# Patient Record
Sex: FEMALE | Race: WHITE | NOT HISPANIC OR LATINO | Employment: FULL TIME | URBAN - METROPOLITAN AREA
[De-identification: names, ages, dates, MRNs, and addresses within clinical notes are randomized per-mention and may not be internally consistent; named-entity substitution may affect disease eponyms.]

---

## 2020-10-15 PROBLEM — J30.2 SEASONAL ALLERGIES: Status: ACTIVE | Noted: 2020-01-07

## 2023-05-04 ENCOUNTER — OFFICE VISIT (OUTPATIENT)
Dept: OBSTETRICS AND GYNECOLOGY | Age: 43
End: 2023-05-04
Payer: COMMERCIAL

## 2023-05-04 VITALS
DIASTOLIC BLOOD PRESSURE: 74 MMHG | HEIGHT: 63 IN | SYSTOLIC BLOOD PRESSURE: 116 MMHG | WEIGHT: 158.2 LBS | BODY MASS INDEX: 28.03 KG/M2

## 2023-05-04 DIAGNOSIS — Z12.4 SCREENING FOR CERVICAL CANCER: ICD-10-CM

## 2023-05-04 DIAGNOSIS — Z11.51 SCREENING FOR HPV (HUMAN PAPILLOMAVIRUS): ICD-10-CM

## 2023-05-04 DIAGNOSIS — Z01.419 ENCOUNTER FOR GYNECOLOGICAL EXAMINATION WITHOUT ABNORMAL FINDING: Primary | ICD-10-CM

## 2023-05-04 RX ORDER — NORGESTIMATE AND ETHINYL ESTRADIOL 7DAYSX3 28
1 KIT ORAL DAILY
Qty: 84 TABLET | Refills: 3 | Status: SHIPPED | OUTPATIENT
Start: 2023-05-04

## 2023-05-04 NOTE — PROGRESS NOTES
Routine Annual Visit    2023    Patient: Candice Chatman          MR#:[id number]      Chief Complaint   Patient presents with   • Gynecologic Exam     Annual exam - last pap 10/15/20 neg, mammo 10/20/22, Pt states she has noticed abdominal cramping about 2 weeks before period starts, Pt states this has been happening since around December.        History of Present Illness    43 y.o. female  who presents for annual exam.     On ocps , non smoker  mammo due in oct  Pap done today    Having low level cramping for 2 week leading up to menses  No irregular bleeding  Exam is normal today, will observe, if worsens or irregular bleeding would rec gyn US      Patient's last menstrual period was 2023 (exact date).  Obstetric History:  OB History        1    Para        Term   0            AB   1    Living   0       SAB        IAB        Ectopic        Molar        Multiple        Live Births                   Menstrual History:     Patient's last menstrual period was 2023 (exact date).       Sexual History:       ________________________________________  Patient Active Problem List   Diagnosis   • Slow transit constipation   • Encounter for surveillance of contraceptive pills   • Heartburn   • Anxiety disorder   • BUSCH (dyspnea on exertion)   • Former smoker   • GERD (gastroesophageal reflux disease)   • Seasonal allergies       Past Medical History:   Diagnosis Date   • Anemia    • ASCUS with positive high risk HPV cervical    • Dysplasia of cervix, low grade (LILIANA 1)    • HPV test positive        Past Surgical History:   Procedure Laterality Date   • ABDOMINOPLASTY     • BILATERAL BREAST REDUCTION  2010   • BREAST BIOPSY     • COLONOSCOPY  approx     LGA   • UPPER GASTROINTESTINAL ENDOSCOPY  approx    • WISDOM TOOTH EXTRACTION         Social History     Tobacco Use   Smoking Status Former   Smokeless Tobacco Never       has a current medication list which includes the  "following prescription(s): cetirizine, norgestimate-ethinyl estradiol, and pantoprazole.  ________________________________________    Current contraception: OCP (estrogen/progesterone)  History of abnormal Pap smear: no  Family history of Breast cancer: PGM  Family history of uterine or ovarian cancer: no  Family History of colon cancer/colon polyps: no  History of abnormal mammogram: no      The following portions of the patient's history were reviewed and updated as appropriate: allergies, current medications, past family history, past medical history, past social history, past surgical history and problem list.    Review of Systems    Pertinent items are noted in HPI.     Objective   Physical Exam    /74   Ht 160 cm (63\")   Wt 71.8 kg (158 lb 3.2 oz)   LMP 04/11/2023 (Exact Date)   BMI 28.02 kg/m²    BP Readings from Last 3 Encounters:   05/04/23 116/74   04/01/22 118/70   01/07/22 117/77      Wt Readings from Last 3 Encounters:   05/04/23 71.8 kg (158 lb 3.2 oz)   04/01/22 73 kg (160 lb 14.4 oz)   01/26/22 73.8 kg (162 lb 12.8 oz)      BMI: Estimated body mass index is 28.02 kg/m² as calculated from the following:    Height as of this encounter: 160 cm (63\").    Weight as of this encounter: 71.8 kg (158 lb 3.2 oz).      General:   alert, appears stated age and cooperative   Abdomen: soft, non-tender, without masses or organomegaly   Breast: inspection negative, no nipple discharge or bleeding, no masses or nodularity palpable   Vulva: normal   Vagina: normal mucosa   Cervix: no cervical motion tenderness and no lesions   Uterus: normal size, mobile and non-tender   Adnexa: normal adnexa and no mass, fullness, tenderness     Assessment:    1. Normal annual exam   Assessment     ICD-10-CM ICD-9-CM   1. Encounter for gynecological examination without abnormal finding  Z01.419 V72.31   2. Screening for cervical cancer  Z12.4 V76.2   3. Screening for HPV (human papillomavirus)  Z11.51 V73.81 "     Plan:    Plan     []  Mammogram request made  [x]  PAP done  []  Labs:   []  GC/Chl/TV  []  DEXA scan   []  Referral for colonoscopy:       Diagnoses and all orders for this visit:    1. Encounter for gynecological examination without abnormal finding (Primary)    2. Screening for cervical cancer  -     IGP, Apt HPV,rfx 16 / 18,45    3. Screening for HPV (human papillomavirus)  -     IGP, Apt HPV,rfx 16 / 18,45    Other orders  -     norgestimate-ethinyl estradiol (Tri-Sprintec) 0.18/0.215/0.25 MG-35 MCG per tablet; Take 1 tablet by mouth Daily.  Dispense: 84 tablet; Refill: 3            Counseling:  --Nutrition: Stressed importance of moderation and caloric balance, stressed fresh fruit and vegetables  --Exercise: Stressed the importance of regular exercise. 3-5 times weekly   - Discussed screening mammogram recommendations.   --Discussed benefits of screening colonoscopy- age 45 unless FH  --Discussed pap smear screening recommendations

## 2023-05-10 LAB
CYTOLOGIST CVX/VAG CYTO: NORMAL
CYTOLOGY CVX/VAG DOC CYTO: NORMAL
CYTOLOGY CVX/VAG DOC THIN PREP: NORMAL
DX ICD CODE: NORMAL
HIV 1 & 2 AB SER-IMP: NORMAL
HPV I/H RISK 4 DNA CVX QL PROBE+SIG AMP: NEGATIVE
OTHER STN SPEC: NORMAL
STAT OF ADQ CVX/VAG CYTO-IMP: NORMAL

## 2023-08-02 DIAGNOSIS — Z12.31 SCREENING MAMMOGRAM FOR BREAST CANCER: Primary | ICD-10-CM

## 2023-08-07 ENCOUNTER — OFFICE VISIT (OUTPATIENT)
Dept: OBSTETRICS AND GYNECOLOGY | Age: 43
End: 2023-08-07
Payer: COMMERCIAL

## 2023-08-07 VITALS
BODY MASS INDEX: 27.46 KG/M2 | SYSTOLIC BLOOD PRESSURE: 112 MMHG | DIASTOLIC BLOOD PRESSURE: 70 MMHG | HEIGHT: 63 IN | WEIGHT: 155 LBS

## 2023-08-07 DIAGNOSIS — N94.6 DYSMENORRHEA: ICD-10-CM

## 2023-08-07 DIAGNOSIS — R10.2 PELVIC PAIN: Primary | ICD-10-CM

## 2023-08-07 DIAGNOSIS — R63.5 WEIGHT GAIN: ICD-10-CM

## 2023-08-07 DIAGNOSIS — D25.1 INTRAMURAL LEIOMYOMA OF UTERUS: ICD-10-CM

## 2023-08-07 PROCEDURE — 99213 OFFICE O/P EST LOW 20 MIN: CPT | Performed by: OBSTETRICS & GYNECOLOGY

## 2023-08-07 RX ORDER — MONTELUKAST SODIUM 10 MG/1
1 TABLET ORAL NIGHTLY
COMMUNITY
Start: 2023-07-31

## 2023-08-07 NOTE — PROGRESS NOTES
GYN Visit    2023    Patient: Candice Chatman          MR#:[id number]      Chief Complaint   Patient presents with    Follow-up     Gyn F/u & US - Pt c/o abdominal cramping but states she does not have heavy menses, pt states she met with a dietician a few weeks ago & they advised her to have her hormones tested today         History of Present Illness    43 y.o. female  who presents for  new issues x2    Cramping between cycles, on ocps    Trouble losing weight, saw a dietician who suggested she get her hormones tested    Occ hot at night, not nec a hot flash or NS, no change on placebo days of ocp    Reviewed US- fairly normal, 2 small fibroids and left 2.9 cm follicle , no pain with scan    Discussed IUD, ablation , Vas and hyst to treat cramping and change metabolic situation or hormonal situation (get off the pill)  Pt declines          Patient's last menstrual period was 2023 (exact date).    ________________________________________  Patient Active Problem List   Diagnosis    Slow transit constipation    Encounter for surveillance of contraceptive pills    Heartburn    Anxiety disorder    BUSCH (dyspnea on exertion)    Former smoker    GERD (gastroesophageal reflux disease)    Seasonal allergies       Past Medical History:   Diagnosis Date    Anemia     ASCUS with positive high risk HPV cervical 2010    Dysplasia of cervix, low grade (LILIANA 1)     HPV test positive        Past Surgical History:   Procedure Laterality Date    ABDOMINOPLASTY      BILATERAL BREAST REDUCTION  2010    BREAST BIOPSY      COLONOSCOPY  approx     LGA    UPPER GASTROINTESTINAL ENDOSCOPY  approx     WISDOM TOOTH EXTRACTION         Social History     Tobacco Use   Smoking Status Former   Smokeless Tobacco Never       has a current medication list which includes the following prescription(s): cetirizine, montelukast, norgestimate-ethinyl estradiol, and  "pantoprazole.  ________________________________________    Current contraception: OCP (estrogen/progesterone)      The following portions of the patient's history were reviewed and updated as appropriate: allergies, current medications, past family history, past medical history, past social history, past surgical history, and problem list.    Review of Systems    Pertinent items are noted in HPI.     Objective   Physical Exam    /70   Ht 160 cm (63\")   Wt 70.3 kg (155 lb)   LMP 08/02/2023 (Exact Date)   BMI 27.46 kg/mý    BP Readings from Last 3 Encounters:   08/07/23 112/70   05/04/23 116/74   04/01/22 118/70      Wt Readings from Last 3 Encounters:   08/07/23 70.3 kg (155 lb)   05/04/23 71.8 kg (158 lb 3.2 oz)   04/01/22 73 kg (160 lb 14.4 oz)      BMI: Estimated body mass index is 27.46 kg/mý as calculated from the following:    Height as of this encounter: 160 cm (63\").    Weight as of this encounter: 70.3 kg (155 lb).    Lungs: non labored breathing, no wheezing or tachpnea  Extremities: extremities normal, atraumatic, no cyanosis or edema  Skin: Skin color, texture, turgor normal. No rashes or lesions  Neurologic: Grossly normal  General:   alert, appears stated age, and cooperative   Abdomen: soft, non-tender, without masses or organomegaly            See report of US  Normal size and endometrial lining is thin, 2 small fibroids, ovaries normal, left with 2.9 cm cyst                 Assessment:      Diagnoses and all orders for this visit:    1. Pelvic pain (Primary)    2. Dysmenorrhea    3. Intramural leiomyoma of uterus    4. Weight gain  -     TSH      Would need to be off ocps to test hormones and would not change plan , reassured pt, US is normal         "

## 2023-08-08 LAB — TSH SERPL DL<=0.005 MIU/L-ACNC: 2.04 UIU/ML (ref 0.27–4.2)

## 2023-10-10 RX ORDER — PANTOPRAZOLE SODIUM 40 MG/1
40 TABLET, DELAYED RELEASE ORAL DAILY
Qty: 90 TABLET | Refills: 1 | Status: SHIPPED | OUTPATIENT
Start: 2023-10-10

## 2023-10-25 ENCOUNTER — HOSPITAL ENCOUNTER (OUTPATIENT)
Facility: HOSPITAL | Age: 43
Discharge: HOME | End: 2023-10-25
Admitting: OBSTETRICS & GYNECOLOGY
Payer: COMMERCIAL

## 2023-10-25 DIAGNOSIS — Z12.31 SCREENING MAMMOGRAM FOR BREAST CANCER: ICD-10-CM

## 2023-10-25 PROCEDURE — 77067 SCR MAMMO BI INCL CAD: CPT

## 2023-10-25 PROCEDURE — 77063 BREAST TOMOSYNTHESIS BI: CPT

## 2023-10-30 DIAGNOSIS — R92.1 BREAST CALCIFICATIONS: Primary | ICD-10-CM

## 2023-11-03 ENCOUNTER — APPOINTMENT (OUTPATIENT)
Dept: WOMENS IMAGING | Facility: HOSPITAL | Age: 43
End: 2023-11-03
Payer: COMMERCIAL

## 2023-11-03 PROCEDURE — 77065 DX MAMMO INCL CAD UNI: CPT | Performed by: RADIOLOGY

## 2023-11-03 PROCEDURE — G0279 TOMOSYNTHESIS, MAMMO: HCPCS | Performed by: RADIOLOGY

## 2023-11-03 PROCEDURE — 77061 BREAST TOMOSYNTHESIS UNI: CPT | Performed by: RADIOLOGY

## 2023-11-09 DIAGNOSIS — R92.8 ABNORMAL MAMMOGRAM: Primary | ICD-10-CM

## 2024-01-10 ENCOUNTER — OFFICE VISIT (OUTPATIENT)
Dept: GASTROENTEROLOGY | Facility: CLINIC | Age: 44
End: 2024-01-10
Payer: COMMERCIAL

## 2024-01-10 VITALS
WEIGHT: 156.2 LBS | DIASTOLIC BLOOD PRESSURE: 79 MMHG | BODY MASS INDEX: 27.68 KG/M2 | HEART RATE: 62 BPM | TEMPERATURE: 98.3 F | HEIGHT: 63 IN | SYSTOLIC BLOOD PRESSURE: 117 MMHG

## 2024-01-10 DIAGNOSIS — K59.01 SLOW TRANSIT CONSTIPATION: ICD-10-CM

## 2024-01-10 DIAGNOSIS — R12 HEARTBURN: Primary | ICD-10-CM

## 2024-01-10 RX ORDER — PANTOPRAZOLE SODIUM 40 MG/1
40 TABLET, DELAYED RELEASE ORAL DAILY
Qty: 90 TABLET | Refills: 3 | Status: SHIPPED | OUTPATIENT
Start: 2024-01-10

## 2024-01-10 RX ORDER — FAMOTIDINE 40 MG/1
40 TABLET, FILM COATED ORAL
Qty: 90 TABLET | Refills: 2 | Status: SHIPPED | OUTPATIENT
Start: 2024-01-10

## 2024-01-10 RX ORDER — AMOXICILLIN 500 MG/1
500 TABLET, FILM COATED ORAL
COMMUNITY
Start: 2024-01-08 | End: 2024-01-18

## 2024-01-10 NOTE — PROGRESS NOTES
"Chief Complaint  Heartburn    Subjective          History Of Present Illness:    Candice Chatman is a  43 y.o. female patient of Dr. Hussein who has a history of esophageal dysphagia, GERD.     Patient reports she has had increased heartburn over the last month. She has been taking Protonix daily. She has been using navin-seltzer chewables and Nexium but did not feel like this helped.  Patient reports the symptoms are worse at night. No dysphagia, feels like she has increased drainage in the back of her throat. Patient reports she has minimal stress and  has not made many food changes.  She has been drinking lemon in her water and has stopped this recently. She sleeps upright.  No nausea or vomiting.  Appetite is good and weight is stable.    She reports constipation. Previously on Linzess. She tries to increase her water intake. She east a high fiber.  No rectal bleeding or abdominal pain.    Additional data reviewed:   C-scope 1/23/19 - melanosis, normal ileum, NBIH  EGD 4/13/22 - empiric dilation of esophagus, acute gastritis, normal duodenum     Objective   Vital Signs:   /79   Pulse 62   Temp 98.3 °F (36.8 °C)   Ht 160 cm (63\")   Wt 70.9 kg (156 lb 3.2 oz)   BMI 27.67 kg/m²       Physical Exam  Vitals reviewed.   Constitutional:       General: She is not in acute distress.     Appearance: Normal appearance. She is not ill-appearing.   HENT:      Head: Normocephalic and atraumatic.      Nose: Nose normal.   Eyes:      Extraocular Movements: Extraocular movements intact.      Conjunctiva/sclera: Conjunctivae normal.      Pupils: Pupils are equal, round, and reactive to light.   Pulmonary:      Effort: Pulmonary effort is normal.   Musculoskeletal:         General: No swelling. Normal range of motion.      Cervical back: Normal range of motion.   Skin:     General: Skin is warm and dry.      Findings: No bruising or rash.   Neurological:      General: No focal deficit present.      Mental Status: She is " alert and oriented to person, place, and time.      Motor: No weakness.      Gait: Gait normal.   Psychiatric:         Mood and Affect: Mood normal.          Result Review :   The following data was reviewed by: Caitlin Merchant PA-C on 01/10/2024:      Assessment and Plan    Diagnoses and all orders for this visit:    1. Heartburn (Primary)  -     famotidine (Pepcid) 40 MG tablet; Take 1 tablet by mouth Every Morning Before Breakfast.  Dispense: 90 tablet; Refill: 2  -     pantoprazole (PROTONIX) 40 MG EC tablet; Take 1 tablet by mouth Daily.  Dispense: 90 tablet; Refill: 3    2. Slow transit constipation       Start Pepcid 40 mg in the morning and take pantoprazole 30 minutes before dinner at night  Patient to update me in approximately 6 weeks  Continue increased water intake and high-fiber diet for constipation.  We did discuss that she could use MiraLAX as needed or even daily if she is persistently constipated.    Follow Up   Return if symptoms worsen or fail to improve.    Dragon dictation used throughout this note.            Caitlin Manuel PA-C   [facility]  [address] [place] [address]  Office: [phone]

## 2024-05-07 ENCOUNTER — APPOINTMENT (OUTPATIENT)
Dept: WOMENS IMAGING | Facility: HOSPITAL | Age: 44
End: 2024-05-07
Payer: COMMERCIAL

## 2024-05-07 PROCEDURE — G0279 TOMOSYNTHESIS, MAMMO: HCPCS | Performed by: RADIOLOGY

## 2024-05-07 PROCEDURE — 77065 DX MAMMO INCL CAD UNI: CPT | Performed by: RADIOLOGY

## 2024-05-07 PROCEDURE — 77061 BREAST TOMOSYNTHESIS UNI: CPT | Performed by: RADIOLOGY

## 2024-05-15 ENCOUNTER — TELEPHONE (OUTPATIENT)
Dept: OBSTETRICS AND GYNECOLOGY | Age: 44
End: 2024-05-15
Payer: COMMERCIAL

## 2024-05-15 DIAGNOSIS — R92.1 CALCIFICATION OF LEFT BREAST: Primary | ICD-10-CM

## 2024-05-15 NOTE — TELEPHONE ENCOUNTER
5/15/2024   pt and imaging results given and the need for a breast bx. Order placed for L stereotatic biopsy and someone will call to schedule

## 2024-05-29 RX ORDER — NORGESTIMATE AND ETHINYL ESTRADIOL 7DAYSX3 28
1 KIT ORAL DAILY
Qty: 84 TABLET | Refills: 0 | Status: SHIPPED | OUTPATIENT
Start: 2024-05-29

## 2024-06-10 ENCOUNTER — APPOINTMENT (OUTPATIENT)
Dept: WOMENS IMAGING | Facility: HOSPITAL | Age: 44
End: 2024-06-10
Payer: COMMERCIAL

## 2024-06-10 PROCEDURE — 77061 BREAST TOMOSYNTHESIS UNI: CPT | Performed by: RADIOLOGY

## 2024-06-10 PROCEDURE — 77065 DX MAMMO INCL CAD UNI: CPT | Performed by: RADIOLOGY

## 2024-06-10 PROCEDURE — G0279 TOMOSYNTHESIS, MAMMO: HCPCS | Performed by: RADIOLOGY

## 2024-06-11 DIAGNOSIS — R92.1 CALCIFICATION OF LEFT BREAST: Primary | ICD-10-CM

## 2024-07-15 ENCOUNTER — OFFICE VISIT (OUTPATIENT)
Dept: OBSTETRICS AND GYNECOLOGY | Age: 44
End: 2024-07-15
Payer: COMMERCIAL

## 2024-07-15 VITALS
DIASTOLIC BLOOD PRESSURE: 86 MMHG | BODY MASS INDEX: 25.45 KG/M2 | SYSTOLIC BLOOD PRESSURE: 118 MMHG | WEIGHT: 143.6 LBS | HEIGHT: 63 IN

## 2024-07-15 DIAGNOSIS — Z12.31 SCREENING MAMMOGRAM FOR BREAST CANCER: ICD-10-CM

## 2024-07-15 DIAGNOSIS — Z01.419 ENCOUNTER FOR GYNECOLOGICAL EXAMINATION WITHOUT ABNORMAL FINDING: Primary | ICD-10-CM

## 2024-07-15 PROCEDURE — 99396 PREV VISIT EST AGE 40-64: CPT | Performed by: OBSTETRICS & GYNECOLOGY

## 2024-07-15 RX ORDER — NORGESTIMATE AND ETHINYL ESTRADIOL 7DAYSX3 28
1 KIT ORAL DAILY
Qty: 84 TABLET | Refills: 3 | Status: SHIPPED | OUTPATIENT
Start: 2024-07-15

## 2024-07-15 NOTE — PROGRESS NOTES
Routine Annual Visit    7/15/2024    Patient: Candice Chatman          MR#:[id number]      Chief Complaint   Patient presents with    Gynecologic Exam     Annual Exam - last pap 23 neg, mammo 10/25/23, pt has no complaints today       History of Present Illness    44 y.o. female  who presents for annual exam.     Patient is doing well  She is up-to-date on her Pap  Mammogram is due in October  She is on a birth control pill and doing well on this  No other concerns      Patient's last menstrual period was 2024.  Obstetric History:  OB History          1    Para        Term   0            AB   1    Living   0         SAB        IAB        Ectopic        Molar        Multiple        Live Births                   Menstrual History:     Patient's last menstrual period was 2024.       Sexual History:       ________________________________________  Patient Active Problem List   Diagnosis    Slow transit constipation    Encounter for surveillance of contraceptive pills    Heartburn    Anxiety disorder    BUSCH (dyspnea on exertion)    Former smoker    GERD (gastroesophageal reflux disease)    Seasonal allergies       Past Medical History:   Diagnosis Date    Anemia     Anxiety disorder 2019    ASCUS with positive high risk HPV cervical 2010    Dysplasia of cervix, low grade (LILIANA 1)     HPV test positive        Past Surgical History:   Procedure Laterality Date    ABDOMINOPLASTY      BILATERAL BREAST REDUCTION  2010    BREAST BIOPSY      COLONOSCOPY  approx     LGA    UPPER GASTROINTESTINAL ENDOSCOPY  approx     WISDOM TOOTH EXTRACTION         Social History     Tobacco Use   Smoking Status Former    Current packs/day: 0.00    Types: Cigarettes    Quit date: 2018    Years since quittin.9   Smokeless Tobacco Never       has a current medication list which includes the following prescription(s): famotidine, norgestimate-ethinyl estradiol, and  "pantoprazole.  ________________________________________    Current contraception: OCP (estrogen/progesterone)  History of abnormal Pap smear: no  Family history of Breast cancer: MGM  Family history of uterine or ovarian cancer: no  Family History of colon cancer/colon polyps: no  History of abnormal mammogram: no      The following portions of the patient's history were reviewed and updated as appropriate: allergies, current medications, past family history, past medical history, past social history, past surgical history, and problem list.    Review of Systems    Pertinent items are noted in HPI.     Objective   Physical Exam    /86   Ht 160 cm (63\")   Wt 65.1 kg (143 lb 9.6 oz)   LMP 07/04/2024   BMI 25.44 kg/m²    BP Readings from Last 3 Encounters:   07/15/24 118/86   01/10/24 117/79   08/07/23 112/70      Wt Readings from Last 3 Encounters:   07/15/24 65.1 kg (143 lb 9.6 oz)   01/10/24 70.9 kg (156 lb 3.2 oz)   08/07/23 70.3 kg (155 lb)      BMI: Estimated body mass index is 25.44 kg/m² as calculated from the following:    Height as of this encounter: 160 cm (63\").    Weight as of this encounter: 65.1 kg (143 lb 9.6 oz).      General:   alert, appears stated age, and cooperative   Abdomen: soft, non-tender, without masses or organomegaly   Breast: inspection negative, no nipple discharge or bleeding, no masses or nodularity palpable   Vulva: normal   Vagina: normal mucosa   Cervix: no cervical motion tenderness and no lesions   Uterus: normal size, mobile, and non-tender   Adnexa: no mass, fullness, tenderness     Assessment:    1. Normal annual exam   Assessment     ICD-10-CM ICD-9-CM   1. Encounter for gynecological examination without abnormal finding  Z01.419 V72.31   2. Screening mammogram for breast cancer  Z12.31 V76.12     Plan:    Plan     [x]  Mammogram request made  []  PAP done  []  Labs:   []  GC/Chl/TV  []  DEXA scan   []  Referral for colonoscopy:       Diagnoses and all orders for " this visit:    1. Encounter for gynecological examination without abnormal finding (Primary)    2. Screening mammogram for breast cancer  -     Mammo Screening Digital Tomosynthesis Bilateral With CAD; Future    Other orders  -     norgestimate-ethinyl estradiol (Tri-Sprintec) 0.18/0.215/0.25 MG-35 MCG per tablet; Take 1 tablet by mouth Daily.  Dispense: 84 tablet; Refill: 3            Counseling:  --Nutrition: Stressed importance of moderation and caloric balance, stressed fresh fruit and vegetables  --Exercise: Stressed the importance of regular exercise. 3-5 times weekly   - Discussed screening mammogram recommendations.   --Discussed benefits of screening colonoscopy- age 45 unless FH  --Discussed pap smear screening recommendations

## 2024-09-15 DIAGNOSIS — R12 HEARTBURN: ICD-10-CM

## 2024-09-16 RX ORDER — FAMOTIDINE 40 MG/1
40 TABLET, FILM COATED ORAL
Qty: 90 TABLET | Refills: 2 | Status: SHIPPED | OUTPATIENT
Start: 2024-09-16

## 2024-10-15 ENCOUNTER — APPOINTMENT (OUTPATIENT)
Dept: WOMENS IMAGING | Facility: HOSPITAL | Age: 44
End: 2024-10-15
Payer: COMMERCIAL

## 2024-10-15 PROCEDURE — 77066 DX MAMMO INCL CAD BI: CPT | Performed by: RADIOLOGY

## 2024-10-15 PROCEDURE — G0279 TOMOSYNTHESIS, MAMMO: HCPCS | Performed by: RADIOLOGY

## 2024-10-15 PROCEDURE — 77062 BREAST TOMOSYNTHESIS BI: CPT | Performed by: RADIOLOGY

## 2024-10-17 DIAGNOSIS — R92.8 ABNORMALITY OF LEFT BREAST ON SCREENING MAMMOGRAM: Primary | ICD-10-CM

## 2024-12-26 DIAGNOSIS — R12 HEARTBURN: ICD-10-CM

## 2024-12-26 RX ORDER — PANTOPRAZOLE SODIUM 40 MG/1
40 TABLET, DELAYED RELEASE ORAL DAILY
Qty: 90 TABLET | Refills: 1 | Status: SHIPPED | OUTPATIENT
Start: 2024-12-26

## 2025-04-17 ENCOUNTER — APPOINTMENT (OUTPATIENT)
Dept: WOMENS IMAGING | Facility: HOSPITAL | Age: 45
End: 2025-04-17
Payer: COMMERCIAL

## 2025-04-17 PROCEDURE — 77065 DX MAMMO INCL CAD UNI: CPT | Performed by: RADIOLOGY

## 2025-04-17 PROCEDURE — G0279 TOMOSYNTHESIS, MAMMO: HCPCS | Performed by: RADIOLOGY

## 2025-04-17 PROCEDURE — 77061 BREAST TOMOSYNTHESIS UNI: CPT | Performed by: RADIOLOGY

## 2025-04-21 DIAGNOSIS — Z12.31 SCREENING MAMMOGRAM FOR BREAST CANCER: ICD-10-CM

## 2025-04-21 DIAGNOSIS — R92.1 CALCIFICATION OF LEFT BREAST: Primary | ICD-10-CM

## 2025-06-04 DIAGNOSIS — R12 HEARTBURN: ICD-10-CM

## 2025-06-05 ENCOUNTER — TELEPHONE (OUTPATIENT)
Dept: GASTROENTEROLOGY | Facility: CLINIC | Age: 45
End: 2025-06-05
Payer: COMMERCIAL

## 2025-06-05 DIAGNOSIS — R12 HEARTBURN: ICD-10-CM

## 2025-06-05 RX ORDER — PANTOPRAZOLE SODIUM 40 MG/1
40 TABLET, DELAYED RELEASE ORAL DAILY
Qty: 90 TABLET | Refills: 1 | OUTPATIENT
Start: 2025-06-05

## 2025-06-05 RX ORDER — FAMOTIDINE 40 MG/1
40 TABLET, FILM COATED ORAL
Qty: 90 TABLET | Refills: 1 | Status: SHIPPED | OUTPATIENT
Start: 2025-06-05

## 2025-06-05 RX ORDER — PANTOPRAZOLE SODIUM 40 MG/1
40 TABLET, DELAYED RELEASE ORAL DAILY
Qty: 90 TABLET | Refills: 1 | Status: SHIPPED | OUTPATIENT
Start: 2025-06-05

## 2025-06-05 RX ORDER — FAMOTIDINE 40 MG/1
40 TABLET, FILM COATED ORAL
Qty: 90 TABLET | Refills: 2 | OUTPATIENT
Start: 2025-06-05

## 2025-06-05 NOTE — TELEPHONE ENCOUNTER
Scheduled the pt to see Caitlin on 8/25 for a follow up.  Pt needs her pantoprazole & famotidine refilled.

## 2025-06-15 PROBLEM — Z81.8 FAMILY HISTORY OF DEMENTIA: Status: ACTIVE | Noted: 2024-06-18

## 2025-06-15 PROBLEM — K59.09 CHRONIC CONSTIPATION: Status: ACTIVE | Noted: 2024-06-18

## 2025-06-15 PROBLEM — E78.2 MIXED HYPERLIPIDEMIA: Status: ACTIVE | Noted: 2024-06-18

## 2025-06-16 ENCOUNTER — HOSPITAL ENCOUNTER (EMERGENCY)
Facility: HOSPITAL | Age: 45
Discharge: HOME | End: 2025-06-16
Attending: EMERGENCY MEDICINE | Admitting: EMERGENCY MEDICINE
Payer: COMMERCIAL

## 2025-06-16 ENCOUNTER — APPOINTMENT (OUTPATIENT)
Dept: CT IMAGING | Facility: HOSPITAL | Age: 45
End: 2025-06-16
Payer: COMMERCIAL

## 2025-06-16 VITALS
TEMPERATURE: 98.3 F | RESPIRATION RATE: 16 BRPM | SYSTOLIC BLOOD PRESSURE: 118 MMHG | DIASTOLIC BLOOD PRESSURE: 75 MMHG | OXYGEN SATURATION: 97 % | HEART RATE: 76 BPM

## 2025-06-16 DIAGNOSIS — R51.9 ACUTE NONINTRACTABLE HEADACHE, UNSPECIFIED HEADACHE TYPE: Primary | ICD-10-CM

## 2025-06-16 LAB
ANION GAP SERPL CALCULATED.3IONS-SCNC: 11 MMOL/L (ref 5–15)
BASOPHILS # BLD AUTO: 0.1 10*3/MM3 (ref 0–0.2)
BASOPHILS NFR BLD AUTO: 0.9 % (ref 0–1.5)
BUN SERPL-MCNC: 10 MG/DL (ref 6–20)
BUN/CREAT SERPL: 11.6 (ref 7–25)
CALCIUM SPEC-SCNC: 9.6 MG/DL (ref 8.6–10.5)
CHLORIDE SERPL-SCNC: 105 MMOL/L (ref 98–107)
CO2 SERPL-SCNC: 22 MMOL/L (ref 22–29)
CREAT SERPL-MCNC: 0.86 MG/DL (ref 0.57–1)
DEPRECATED RDW RBC AUTO: 38.2 FL (ref 37–54)
EGFRCR SERPLBLD CKD-EPI 2021: 85 ML/MIN/1.73
EOSINOPHIL # BLD AUTO: 0.16 10*3/MM3 (ref 0–0.4)
EOSINOPHIL NFR BLD AUTO: 1.4 % (ref 0.3–6.2)
ERYTHROCYTE [DISTWIDTH] IN BLOOD BY AUTOMATED COUNT: 11.8 % (ref 12.3–15.4)
GLUCOSE SERPL-MCNC: 85 MG/DL (ref 65–99)
HCT VFR BLD AUTO: 39.3 % (ref 34–46.6)
HGB BLD-MCNC: 13.4 G/DL (ref 12–15.9)
IMM GRANULOCYTES # BLD AUTO: 0.03 10*3/MM3 (ref 0–0.05)
IMM GRANULOCYTES NFR BLD AUTO: 0.3 % (ref 0–0.5)
LYMPHOCYTES # BLD AUTO: 2.93 10*3/MM3 (ref 0.7–3.1)
LYMPHOCYTES NFR BLD AUTO: 25.9 % (ref 19.6–45.3)
MCH RBC QN AUTO: 30.3 PG (ref 26.6–33)
MCHC RBC AUTO-ENTMCNC: 34.1 G/DL (ref 31.5–35.7)
MCV RBC AUTO: 88.9 FL (ref 79–97)
MONOCYTES # BLD AUTO: 0.76 10*3/MM3 (ref 0.1–0.9)
MONOCYTES NFR BLD AUTO: 6.7 % (ref 5–12)
NEUTROPHILS NFR BLD AUTO: 64.8 % (ref 42.7–76)
NEUTROPHILS NFR BLD AUTO: 7.35 10*3/MM3 (ref 1.7–7)
NRBC BLD AUTO-RTO: 0 /100 WBC (ref 0–0.2)
PLATELET # BLD AUTO: 281 10*3/MM3 (ref 140–450)
PMV BLD AUTO: 10.3 FL (ref 6–12)
POTASSIUM SERPL-SCNC: 4.4 MMOL/L (ref 3.5–5.2)
RBC # BLD AUTO: 4.42 10*6/MM3 (ref 3.77–5.28)
SODIUM SERPL-SCNC: 138 MMOL/L (ref 136–145)
WBC NRBC COR # BLD AUTO: 11.33 10*3/MM3 (ref 3.4–10.8)

## 2025-06-16 PROCEDURE — 96374 THER/PROPH/DIAG INJ IV PUSH: CPT

## 2025-06-16 PROCEDURE — 25010000002 DIPHENHYDRAMINE PER 50 MG: Performed by: EMERGENCY MEDICINE

## 2025-06-16 PROCEDURE — 70450 CT HEAD/BRAIN W/O DYE: CPT

## 2025-06-16 PROCEDURE — 80048 BASIC METABOLIC PNL TOTAL CA: CPT | Performed by: EMERGENCY MEDICINE

## 2025-06-16 PROCEDURE — 96375 TX/PRO/DX INJ NEW DRUG ADDON: CPT

## 2025-06-16 PROCEDURE — 25010000002 DROPERIDOL PER 5 MG: Performed by: EMERGENCY MEDICINE

## 2025-06-16 PROCEDURE — 25010000002 KETOROLAC TROMETHAMINE PER 15 MG: Performed by: EMERGENCY MEDICINE

## 2025-06-16 PROCEDURE — 99284 EMERGENCY DEPT VISIT MOD MDM: CPT

## 2025-06-16 PROCEDURE — 85025 COMPLETE CBC W/AUTO DIFF WBC: CPT | Performed by: EMERGENCY MEDICINE

## 2025-06-16 RX ORDER — METOCLOPRAMIDE 5 MG/1
5 TABLET ORAL 3 TIMES DAILY PRN
Qty: 30 TABLET | Refills: 0 | Status: SHIPPED | OUTPATIENT
Start: 2025-06-16

## 2025-06-16 RX ORDER — DIPHENHYDRAMINE HYDROCHLORIDE 50 MG/ML
25 INJECTION, SOLUTION INTRAMUSCULAR; INTRAVENOUS ONCE
Status: COMPLETED | OUTPATIENT
Start: 2025-06-16 | End: 2025-06-16

## 2025-06-16 RX ORDER — SODIUM CHLORIDE 0.9 % (FLUSH) 0.9 %
10 SYRINGE (ML) INJECTION AS NEEDED
Status: DISCONTINUED | OUTPATIENT
Start: 2025-06-16 | End: 2025-06-16 | Stop reason: HOSPADM

## 2025-06-16 RX ORDER — DROPERIDOL 2.5 MG/ML
2.5 INJECTION, SOLUTION INTRAMUSCULAR; INTRAVENOUS ONCE
Status: COMPLETED | OUTPATIENT
Start: 2025-06-16 | End: 2025-06-16

## 2025-06-16 RX ORDER — KETOROLAC TROMETHAMINE 15 MG/ML
15 INJECTION, SOLUTION INTRAMUSCULAR; INTRAVENOUS ONCE
Status: COMPLETED | OUTPATIENT
Start: 2025-06-16 | End: 2025-06-16

## 2025-06-16 RX ADMIN — KETOROLAC TROMETHAMINE 15 MG: 15 INJECTION INTRAMUSCULAR at 19:46

## 2025-06-16 RX ADMIN — DIPHENHYDRAMINE HYDROCHLORIDE 25 MG: 50 INJECTION, SOLUTION INTRAMUSCULAR; INTRAVENOUS at 19:45

## 2025-06-16 RX ADMIN — DROPERIDOL 2.5 MG: 2.5 INJECTION, SOLUTION INTRAMUSCULAR; INTRAVENOUS at 19:48

## 2025-06-16 NOTE — ED PROVIDER NOTES
ED Course as of 06/16/25 2112 Mon Jun 16, 2025 1953 Joined care team, pending CT head and disposition [MP]   2048 CT scan of head independently interpreted by me as no hemorrhage [MP]   2102 Reassessed the patient.  She is feeling improved after medications.  Lab and imaging workup benign today.  She is requesting discharge home.  Will prescribe Reglan she continues with ibuprofen and Benadryl at home.  Encouraged her to follow-up with primary care provider.  Discussed ED return precautions.  She is otherwise well-appearing, hemodynamically stable, and therefore appropriate for discharge. [MP]      ED Course User Index  [MP] Caitlin Angelo, Caitlin Minaya PA-C  06/16/25 2112

## 2025-06-16 NOTE — ED PROVIDER NOTES
EMERGENCY DEPARTMENT ENCOUNTER  Room Number:  36/36  PCP: Kaitlin Wynne APRN  Independent Historians: Patient,  at bedside      HPI:  Chief Complaint: had concerns including Headache.     A complete HPI/ROS/PMH/PSH/SH/FH are unobtainable due to:   Chronic or social conditions impacting patient care (Social Determinants of Health):       Context: Candice Chatman is a 45 y.o. female with a medical history of constipation who presents to the ED c/o acute headache.  Patient reports headache ongoing since Thursday.  Symptoms got much worse on Saturday.  Seen yesterday at urgent care and diagnosed with sinusitis and started on amoxicillin.  Despite this she has had pretty significant left-sided headache.  She has been using over-the-counter ibuprofen without significant relief.  Headache is severe at its worst worsened with certain movements.  Denies recent fever.  Denies head injury.  Patient has had increased stress at work.  She has a .  Denies history of migraines, brain tumor or aneurysm      Review of prior external notes (non-ED) -and- Review of prior external test results outside of this encounter:   I reviewed prior medical records note patient seen at urgent care yesterday and diagnosed with pansinusitis, started on amoxicillin.      Prescription drug monitoring program review:         PAST MEDICAL HISTORY  Active Ambulatory Problems     Diagnosis Date Noted    Slow transit constipation 07/31/2017    Encounter for surveillance of contraceptive pills 08/02/2018    Heartburn 02/20/2019    Anxiety disorder 02/20/2019    BUSCH (dyspnea on exertion) 01/07/2020    Former smoker 01/07/2020    GERD (gastroesophageal reflux disease) 01/07/2020    Seasonal allergies 01/07/2020    Family history of dementia 06/18/2024    Chronic constipation 06/18/2024    Mixed hyperlipidemia 06/18/2024     Resolved Ambulatory Problems     Diagnosis Date Noted    Well female exam with routine gynecological exam  LM for pt to get more info as to why she called regarding her appt. Having more pain? Wanting pain medication? Wanting an MRI? 2017     Past Medical History:   Diagnosis Date    Anemia     ASCUS with positive high risk HPV cervical 2010    Dysplasia of cervix, low grade (LILIANA 1)     HPV test positive          PAST SURGICAL HISTORY  Past Surgical History:   Procedure Laterality Date    ABDOMINOPLASTY      BILATERAL BREAST REDUCTION  2010    BREAST BIOPSY      COLONOSCOPY  approx 2018    LGA    UPPER GASTROINTESTINAL ENDOSCOPY  approx     WISDOM TOOTH EXTRACTION           FAMILY HISTORY  Family History   Problem Relation Age of Onset    No Known Problems Father     Hypertension Mother     Diabetes Mother     No Known Problems Brother     No Known Problems Sister     No Known Problems Paternal Grandfather     No Known Problems Paternal Grandmother     Breast cancer Maternal Grandmother     No Known Problems Maternal Grandfather     Breast cancer Other         un sure of age/ pat ggm    Ovarian cancer Neg Hx     Uterine cancer Neg Hx     Colon cancer Neg Hx          SOCIAL HISTORY  Social History     Socioeconomic History    Marital status:     Number of children: 0    Years of education: [employer] / [employer]   Tobacco Use    Smoking status: Former     Current packs/day: 0.00     Types: Cigarettes     Quit date: 2018     Years since quittin.8    Smokeless tobacco: Never   Vaping Use    Vaping status: Never Used   Substance and Sexual Activity    Alcohol use: Yes     Comment: occasionally    Drug use: No    Sexual activity: Yes     Partners: Male     Birth control/protection: OCP     Comment: TRINESSA         ALLERGIES  Patient has no known allergies.      REVIEW OF SYSTEMS  Review of Systems  Included in HPI  All systems reviewed and negative except for those discussed in HPI.      PHYSICAL EXAM    I have reviewed the triage vital signs and nursing notes.    ED Triage Vitals   Temp Heart Rate Resp BP SpO2   25 1841 25 1841 25 1841 25 1843 25 1841   98.3 °F (36.8 °C) 76 16 142/95 100 %       Temp src Heart Rate Source Patient Position BP Location FiO2 (%)   06/16/25 1841 06/16/25 1841 06/16/25 1843 06/16/25 1843 --   Oral Monitor Sitting Right arm        Physical Exam  GENERAL: Alert well-appearing female no obvious distress.  Triage vitals reviewed and are benign.  SKIN: Warm, dry  HENT: Normocephalic, atraumatic-there is mild diffuse left sinus tenderness to palpation  EYES: no scleral icterus  CV: regular rhythm, regular rate-no murmur  RESPIRATORY: normal effort, lungs clear  ABDOMEN: soft, nontender, nondistended  MUSCULOSKELETAL: no deformity  NEURO: alert, moves all extremities, follows commands      LAB RESULTS  No results found for this or any previous visit (from the past 24 hours).      RADIOLOGY  No Radiology Exams Resulted Within Past 24 Hours      MEDICATIONS GIVEN IN ER  Medications   droperidol (INAPSINE) injection 2.5 mg (2.5 mg Intravenous Given 6/16/25 1948)   diphenhydrAMINE (BENADRYL) injection 25 mg (25 mg Intravenous Given 6/16/25 1945)   ketorolac (TORADOL) injection 15 mg (15 mg Intravenous Given 6/16/25 1946)         ORDERS PLACED DURING THIS VISIT:  Orders Placed This Encounter   Procedures    CT Head Without Contrast    Basic Metabolic Panel    CBC Auto Differential    CBC & Differential         OUTPATIENT MEDICATION MANAGEMENT:  No current Epic-ordered facility-administered medications on file.     Current Outpatient Medications Ordered in Epic   Medication Sig Dispense Refill    amoxicillin-clavulanate (AUGMENTIN) 875-125 MG per tablet Take 1 tablet by mouth 2 (Two) Times a Day for 10 days. 20 tablet 0    cefdinir (OMNICEF) 300 MG capsule 2 capsules (at the same time) once a day 20 capsule 0    famotidine (PEPCID) 40 MG tablet Take 1 tablet by mouth Every Morning Before Breakfast. 90 tablet 1    metoclopramide (REGLAN) 5 MG tablet Take 1 tablet by mouth 3 (Three) Times a Day As Needed (Headache). 30 tablet 0    norgestimate-ethinyl estradiol (Tri-Sprintec) 0.18/0.215/0.25  "MG-35 MCG per tablet Take 1 tablet by mouth Daily. 84 tablet 3    pantoprazole (PROTONIX) 40 MG EC tablet Take 1 tablet by mouth Daily. 90 tablet 1    predniSONE (DELTASONE) 50 MG tablet Take 1 tablet by mouth Daily. 5 tablet 0         PROCEDURES  Procedures            PROGRESS, DATA ANALYSIS, CONSULTS, AND MEDICAL DECISION MAKING  All labs have been independently interpreted by me.  All radiology studies have been reviewed by me. All EKG's have been independently viewed and interpreted by me.  Discussion below represents my analysis of pertinent findings related to patient's condition, differential diagnosis, treatment plan and final disposition.    Differential diagnosis includes but is not limited to migraine, nonspecific headache, sinusitis.      ED Course as of 06/17/25 2255 Mon Jun 16, 2025 1953 Joined care team, pending CT head and disposition [MP]   2048 CT scan of head independently interpreted by me as no hemorrhage [MP]   2102 Reassessed the patient.  She is feeling improved after medications.  Lab and imaging workup benign today.  She is requesting discharge home.  Will prescribe Reglan she continues with ibuprofen and Benadryl at home.  Encouraged her to follow-up with primary care provider.  Discussed ED return precautions.  She is otherwise well-appearing, hemodynamically stable, and therefore appropriate for discharge. [MP]      ED Course User Index  [MP] Caitlin Angelo PA-C             AS OF 22:55 EDT VITALS:    BP - 118/75  HR - 76  TEMP - 98.3 °F (36.8 °C) (Oral)  O2 SATS - 97%    COMPLEXITY OF CARE  45-year-old female presents with significant headache which began on Thursday and worsened on Saturday.  She has not had significant relief with over-the-counter medications and recently diagnosed with sinus infection on amoxicillin.    I did treat patient with \"migraine cocktail\" here in the ED.    I did independently evaluate interpret all ED testing notable for the following:    CBC with " "mildly elevated white count of 11.3 of doubtful clinical significance  Chemistries benign without evidence of renal failure, electrolyte disturbance  CT scan of the brain without obvious acute pathology other than Mild sinus disease    Patient did have significant improvement after \"migraine cocktail\".  Will discharge home with outpatient follow-up.    DIAGNOSIS  Final diagnoses:   Acute nonintractable headache, unspecified headache type         DISPOSITION  ED Disposition       ED Disposition   Discharge    Condition   Stable    Comment   --                Please note that portions of this document were completed with a voice recognition program.    Note Disclaimer: At [facility], we believe that sharing information builds trust and better relationships. You are receiving this note because you recently visited [facility]. It is possible you will see health information before a provider has talked with you about it. This kind of information can be easy to misunderstand. To help you fully understand what it means for your health, we urge you to discuss this note with your provider.         Yury Heath MD  06/17/25 [id number]    "

## 2025-06-17 NOTE — DISCHARGE INSTRUCTIONS
Follow-up with primary care provider.  You may use the metoclopramide in addition to ibuprofen and Benadryl at home to help with headache.  Return to emergency department for any worsening symptoms.

## 2025-06-19 ENCOUNTER — PATIENT ROUNDING (BHMG ONLY) (OUTPATIENT)
Dept: URGENT CARE | Facility: CLINIC | Age: 45
End: 2025-06-19
Payer: COMMERCIAL

## 2025-06-19 NOTE — ED NOTES
Thank you for letting us care for you in your recent visit to our urgent care center. We would love to hear about your experience with us. Was this the first time you have visited our location?    We’re always looking for ways to make our patients’ experiences even better. Do you have any recommendations on ways we may improve?     I appreciate you taking the time to respond. Please be on the lookout for a survey about your recent visit from [facility] via text or email. We would greatly appreciate if you could fill that out and turn it back in. We want your voice to be heard and we value your feedback.   Thank you for choosing [facility] for your healthcare needs.

## 2025-07-21 ENCOUNTER — OFFICE VISIT (OUTPATIENT)
Dept: OBSTETRICS AND GYNECOLOGY | Age: 45
End: 2025-07-21
Payer: COMMERCIAL

## 2025-07-21 VITALS
DIASTOLIC BLOOD PRESSURE: 72 MMHG | WEIGHT: 148 LBS | HEIGHT: 63 IN | BODY MASS INDEX: 26.22 KG/M2 | SYSTOLIC BLOOD PRESSURE: 112 MMHG

## 2025-07-21 DIAGNOSIS — Z01.419 ENCOUNTER FOR GYNECOLOGICAL EXAMINATION WITHOUT ABNORMAL FINDING: Primary | ICD-10-CM

## 2025-07-21 PROCEDURE — 99396 PREV VISIT EST AGE 40-64: CPT | Performed by: OBSTETRICS & GYNECOLOGY

## 2025-07-21 RX ORDER — NORGESTIMATE AND ETHINYL ESTRADIOL 7DAYSX3 28
1 KIT ORAL DAILY
Qty: 84 TABLET | Refills: 3 | OUTPATIENT
Start: 2025-07-21

## 2025-07-21 RX ORDER — NORGESTIMATE AND ETHINYL ESTRADIOL 7DAYSX3 28
1 KIT ORAL DAILY
Qty: 84 TABLET | Refills: 3 | Status: SHIPPED | OUTPATIENT
Start: 2025-07-21

## 2025-07-21 NOTE — PROGRESS NOTES
Routine Annual Visit    2025    Patient: Candice Chatman          MR#:[id number]      Chief Complaint   Patient presents with    Gynecologic Exam     Annual Exam - last pap 23 neg, last screening mammo 10/25/23, diagnostic mammo 25, colonoscopy , pt has no complaints today       History of Present Illness    45 y.o. female  who presents for annual exam.     Pap UTD  On ocps doing well  CSC UTD  Mammogram UTD- will have 6 month follow up     Mother  this past year, dementia and memory care.       Patient's last menstrual period was 2025 (exact date).  Obstetric History:  OB History          1    Para        Term   0            AB   1    Living   0         SAB        IAB        Ectopic        Molar        Multiple        Live Births                   Menstrual History:     Patient's last menstrual period was 2025 (exact date).       Sexual History:       ________________________________________  Patient Active Problem List   Diagnosis    Slow transit constipation    Encounter for surveillance of contraceptive pills    Heartburn    Anxiety disorder    BUSCH (dyspnea on exertion)    Former smoker    GERD (gastroesophageal reflux disease)    Seasonal allergies    Family history of dementia    Chronic constipation    Mixed hyperlipidemia       Past Medical History:   Diagnosis Date    Anemia     Anxiety disorder 2019    ASCUS with positive high risk HPV cervical 2010    Dysplasia of cervix, low grade (LILIANA 1)     GERD (gastroesophageal reflux disease)     HPV test positive     Mixed hyperlipidemia 2024       Past Surgical History:   Procedure Laterality Date    ABDOMINOPLASTY      BILATERAL BREAST REDUCTION  2010    BREAST BIOPSY      COLONOSCOPY   Maybe?    LGA    UPPER GASTROINTESTINAL ENDOSCOPY   Maybe?    WISDOM TOOTH EXTRACTION         Social History     Tobacco Use   Smoking Status Former    Current packs/day: 0.00    Types: Cigarettes     "Quit date: 2018    Years since quittin.9   Smokeless Tobacco Never       has a current medication list which includes the following prescription(s): famotidine, norgestimate-ethinyl estradiol, and pantoprazole.  ________________________________________    Current contraception: OCP (estrogen/progesterone)  History of abnormal Pap smear: no  Family history of Breast cancer: MGM  Family history of uterine or ovarian cancer: no  Family History of colon cancer/colon polyps: no  History of abnormal mammogram: yes - call back      The following portions of the patient's history were reviewed and updated as appropriate: allergies, current medications, past family history, past medical history, past social history, past surgical history, and problem list.    Review of Systems    Pertinent items are noted in HPI.     Objective   Physical Exam    /72 (BP Location: Left arm, Patient Position: Sitting)   Ht 160 cm (63\")   Wt 67.1 kg (148 lb)   LMP 2025 (Exact Date)   BMI 26.22 kg/m²    BP Readings from Last 3 Encounters:   25 112/72   07/10/25 113/86   25 118/75      Wt Readings from Last 3 Encounters:   25 67.1 kg (148 lb)   07/10/25 63.5 kg (140 lb)   24 63.5 kg (140 lb)      BMI: Estimated body mass index is 26.22 kg/m² as calculated from the following:    Height as of this encounter: 160 cm (63\").    Weight as of this encounter: 67.1 kg (148 lb).      General:   alert, appears stated age, and cooperative   Abdomen: soft, non-tender, without masses or organomegaly   Breast: inspection negative, no nipple discharge or bleeding, no masses or nodularity palpable   Vulva: normal, Bartholin's, Urethra, [place]'s normal   Vagina: normal mucosa   Cervix: no cervical motion tenderness and no lesions   Uterus: normal size, mobile, and non-tender   Adnexa: no mass, fullness, tenderness     Assessment:    1. Normal annual exam   Assessment     ICD-10-CM ICD-9-CM   1. Encounter for " gynecological examination without abnormal finding  Z01.419 V72.31     Plan:    Plan     []  Mammogram request made  []  PAP done  []  Labs:   []  GC/Chl/TV  []  DEXA scan   []  Referral for colonoscopy:       Diagnoses and all orders for this visit:    1. Encounter for gynecological examination without abnormal finding (Primary)    Other orders  -     norgestimate-ethinyl estradiol (Tri-Sprintec) 0.18/0.215/0.25 MG-35 MCG per tablet; Take 1 tablet by mouth Daily.  Dispense: 84 tablet; Refill: 3            Counseling:  --Nutrition: Stressed importance of moderation and caloric balance, stressed fresh fruit and vegetables  --Exercise: Stressed the importance of regular exercise. 3-5 times weekly   - Discussed screening mammogram recommendations.   --Discussed benefits of screening colonoscopy- age 45 unless FH  --Discussed pap smear screening recommendations

## 2025-08-25 ENCOUNTER — OFFICE VISIT (OUTPATIENT)
Dept: GASTROENTEROLOGY | Facility: CLINIC | Age: 45
End: 2025-08-25
Payer: COMMERCIAL

## 2025-08-25 VITALS
SYSTOLIC BLOOD PRESSURE: 121 MMHG | HEIGHT: 63 IN | BODY MASS INDEX: 26.54 KG/M2 | DIASTOLIC BLOOD PRESSURE: 75 MMHG | WEIGHT: 149.8 LBS | TEMPERATURE: 96.9 F | HEART RATE: 58 BPM

## 2025-08-25 DIAGNOSIS — R12 HEARTBURN: Primary | ICD-10-CM

## 2025-08-25 PROCEDURE — 99213 OFFICE O/P EST LOW 20 MIN: CPT | Performed by: PHYSICIAN ASSISTANT
